# Patient Record
Sex: FEMALE | Race: OTHER | HISPANIC OR LATINO | URBAN - METROPOLITAN AREA
[De-identification: names, ages, dates, MRNs, and addresses within clinical notes are randomized per-mention and may not be internally consistent; named-entity substitution may affect disease eponyms.]

---

## 2018-11-13 ENCOUNTER — EMERGENCY (EMERGENCY)
Facility: HOSPITAL | Age: 28
LOS: 1 days | Discharge: HOME | End: 2018-11-13
Attending: EMERGENCY MEDICINE | Admitting: EMERGENCY MEDICINE
Payer: SELF-PAY

## 2018-11-13 VITALS
OXYGEN SATURATION: 100 % | RESPIRATION RATE: 18 BRPM | HEART RATE: 89 BPM | SYSTOLIC BLOOD PRESSURE: 131 MMHG | DIASTOLIC BLOOD PRESSURE: 77 MMHG | TEMPERATURE: 98 F

## 2018-11-13 VITALS
OXYGEN SATURATION: 100 % | SYSTOLIC BLOOD PRESSURE: 127 MMHG | HEART RATE: 93 BPM | DIASTOLIC BLOOD PRESSURE: 86 MMHG | TEMPERATURE: 98 F | RESPIRATION RATE: 18 BRPM

## 2018-11-13 PROCEDURE — 99053 MED SERV 10PM-8AM 24 HR FAC: CPT

## 2018-11-13 PROCEDURE — 93010 ELECTROCARDIOGRAM REPORT: CPT

## 2018-11-13 PROCEDURE — 99284 EMERGENCY DEPT VISIT MOD MDM: CPT | Mod: 25

## 2018-11-13 PROCEDURE — 71046 X-RAY EXAM CHEST 2 VIEWS: CPT | Mod: 26

## 2018-11-13 RX ORDER — ACETAMINOPHEN 500 MG
650 TABLET ORAL ONCE
Qty: 0 | Refills: 0 | Status: COMPLETED | OUTPATIENT
Start: 2018-11-13 | End: 2018-11-13

## 2018-11-13 RX ADMIN — Medication 650 MILLIGRAM(S): at 03:35

## 2018-11-13 NOTE — ED PROVIDER NOTE - OBJECTIVE STATEMENT
29 yo f no pmh pw cp. pt states she awoke with pain early yesterday morning, could not fall back asleep. pain has been intermittent since then however does not completely resolve. currently pain is 7/10, sharp, midsternal, localized, relieved by Tylenol moderately, worse with sitting up. no prior hx of sx. reports sob yesterday with episode that has resolved. no OCPs, no hormones, no recent travel, no dvt/pe hx, no hx of blood disorders, no family hx of CAD, no hx CAD, no dyspnea on exertion, no orthopnea, no trauma, no f/c, no n/v. reports feeling "depressed" since birth of son, reports anhedonia. has not been evaluated formally for depression. denies si/hi.    social: no tobacco, alcohol, drugs 27 yo f no pmh pw cp. pt states she awoke with pain early yesterday morning, could not fall back asleep. pain has been intermittent since then however does not completely resolve. currently pain is 7/10, sharp, midsternal, localized, relieved by Tylenol moderately, worse with sitting up. no prior hx of sx. reports sob yesterday with episode that has resolved. no OCPs, no hormones, no recent travel, no dvt/pe hx, no hx of blood disorders, no family hx of CAD, no hx CAD, no dyspnea on exertion, no orthopnea, no trauma, no f/c, no n/v. reports feeling "depressed" since birth of son, reports anhedonia. has not been evaluated formally for depression. denies si/hi.  [place]: 28F no PMH p/w midsternal CP since ~0100, intermittent lasting minutes. Denies associated SOB, NVD, lightheaded, diaphoresis, palpitations, cough/rhinorrhea, black/bloody stool, LE pain/swelling, focal weakness/numbness, recent travel/immobilization, abd pain, urinary complaints, f/c. No hormone use. No FMH CAD/clots/sudden death. No prior cardiac w/u.   social: no tobacco, alcohol, drugs

## 2018-11-13 NOTE — ED PROVIDER NOTE - PROGRESS NOTE DETAILS
Patient reassessed, NAD, non-toxic appearing. reports improvement in pain. feels well to go home. feels safe to go home. results reviewed with pt, questions answered. will provide crisis center info. raciel Gutierrez D.O. PGY1 patient eloped prior to receiving dc paperwork. was informed to wait for papers and crisis center. informed nurse she knew where it was.   - [place] Gutierrez D.O. PGY1

## 2018-11-13 NOTE — ED ADULT NURSE NOTE - OBJECTIVE STATEMENT
Covering RN- Pt received aa&ox4 no pertinent PMH Covering RN- Pt received aa&ox4 no pertinent PMH p/w mid sternal cp, depression and anxiety. Pt states that depression started after son was born 1 year ago and has gotten worse over the past couple weeks. Pt denies si/hi. Pt states she has associated anxiety. Pt in NAD. UCG running. Awaiting further evaluation

## 2018-11-13 NOTE — ED ADULT TRIAGE NOTE - CHIEF COMPLAINT QUOTE
c/o chest pain midsternal, awoke from sleep yesterday feels like "my heart is beating faster than normal". No cardiac history. Also reports neck pain, states feels like muscular soreness, denies trauma.  Also reports feeling anxious and depressed intermittently - denies SI/HI/AVH, no prior psych history, states had a child 1 year ago unsure if related. Appearing calm and in no distress.  EKG obtained.

## 2018-11-13 NOTE — ED PROVIDER NOTE - MEDICAL DECISION MAKING DETAILS
27 yo f pw cp. no pe rf. no cad rf. urine preg negative. cxr, meds. reassess. no psych emergency, will give crisis hotline.

## 2018-11-13 NOTE — ED PROVIDER NOTE - ATTENDING CONTRIBUTION TO CARE
28F no PMH p/w several hrs of intermittent CP, no other systemic symptoms. No PE/cardiac risk factors. Vitals wnl, exam as above.  ddx: Likely MSK vs. GERD/gastritis/PUD. clinically not ACS, PE, tamponade, dissection, PTX, perf, myocarditis, mediastinitis.   CXR, reassess. Likely outpt pmd f/u.

## 2018-11-13 NOTE — ED PROVIDER NOTE - PHYSICAL EXAMINATION
General: well appearing, interactive, well nourished, no apparent distress  HEENT: EOMI, PERRLA, normal mucosa, normal oropharynx, no lesions on the lips or on oral mucosa, normal external ear  Neck: supple, no lymphadenopathy, full range of motion, no nuchal rigidity  CV: RRR, normal S1 and S2 with no murmur, capillary refill less than two seconds  Resp: lungs CTA b/l, good aeration bilaterally, symmetric chest wall   Abd: non-distended, soft, non-tender, normoactive bowel sounds  : no CVA tenderness  MSK: full range of motion, no cyanosis, no edema, no clubbing, no immobility  Neuro: cranial nerves intact, muscle strength 5/5 in all extremities, normal gait  Skin: no rashes, skin intact General: well appearing, interactive, well nourished, no apparent distress  HEENT: EOMI, PERRLA, normal mucosa, normal oropharynx, no lesions on the lips or on oral mucosa, normal external ear  Neck: supple, no lymphadenopathy, full range of motion, no nuchal rigidity  CV: RRR, normal S1 and S2 with no murmur, capillary refill less than two seconds  Resp: lungs CTA b/l, good aeration bilaterally, symmetric chest wall   Abd: non-distended, soft, non-tender, normoactive bowel sounds  : no CVA tenderness  MSK: full range of motion, no cyanosis, no edema, no clubbing, no immobility  Neuro: cranial nerves intact, muscle strength 5/5 in all extremities, normal gait  Skin: no rashes, skin intact    no LE edema, normal equal distal pulses.

## 2018-11-13 NOTE — ED PROVIDER NOTE - NSFOLLOWUPINSTRUCTIONS_ED_ALL_ED_FT
1) Please follow up with your Primary Care Provider in 24-48 hours. A referral has been provided  2) Seek immediate medical care for any new or returning symptoms including but not limited to severe pain, vomiting, shortness of breath, lightheadedness  3) Take Ibuprofen 400-600 mg every 4-6 hours as needed for pain. Do not take more than 1200 mg within a 24 hour period. Take this medication with food  4) Take Tylenol 650 mg every 4-6 hours as needed for pain. Do not take more than 2 grams within a 24 hour period   5) [facility]erral for [facility] provided, call them or walk in to be seen in 24-48 hours